# Patient Record
Sex: FEMALE | Race: BLACK OR AFRICAN AMERICAN | NOT HISPANIC OR LATINO | Employment: OTHER | RURAL
[De-identification: names, ages, dates, MRNs, and addresses within clinical notes are randomized per-mention and may not be internally consistent; named-entity substitution may affect disease eponyms.]

---

## 2024-11-21 DIAGNOSIS — R07.9 CHEST PAIN: Primary | ICD-10-CM

## 2024-12-13 ENCOUNTER — OFFICE VISIT (OUTPATIENT)
Dept: CARDIOLOGY | Facility: CLINIC | Age: 64
End: 2024-12-13
Payer: MEDICARE

## 2024-12-13 VITALS
DIASTOLIC BLOOD PRESSURE: 80 MMHG | SYSTOLIC BLOOD PRESSURE: 120 MMHG | WEIGHT: 197 LBS | HEART RATE: 70 BPM | BODY MASS INDEX: 36.25 KG/M2 | OXYGEN SATURATION: 98 % | HEIGHT: 62 IN

## 2024-12-13 DIAGNOSIS — R06.09 DYSPNEA ON EXERTION: ICD-10-CM

## 2024-12-13 DIAGNOSIS — R07.9 CHEST PAIN, UNSPECIFIED TYPE: Primary | ICD-10-CM

## 2024-12-13 PROCEDURE — 93005 ELECTROCARDIOGRAM TRACING: CPT | Mod: PBBFAC | Performed by: STUDENT IN AN ORGANIZED HEALTH CARE EDUCATION/TRAINING PROGRAM

## 2024-12-13 PROCEDURE — 99204 OFFICE O/P NEW MOD 45 MIN: CPT | Mod: S$PBB,,, | Performed by: STUDENT IN AN ORGANIZED HEALTH CARE EDUCATION/TRAINING PROGRAM

## 2024-12-13 PROCEDURE — 93010 ELECTROCARDIOGRAM REPORT: CPT | Mod: S$PBB,,, | Performed by: STUDENT IN AN ORGANIZED HEALTH CARE EDUCATION/TRAINING PROGRAM

## 2024-12-13 PROCEDURE — 99999 PR PBB SHADOW E&M-NEW PATIENT-LVL IV: CPT | Mod: PBBFAC,,, | Performed by: STUDENT IN AN ORGANIZED HEALTH CARE EDUCATION/TRAINING PROGRAM

## 2024-12-13 PROCEDURE — 99204 OFFICE O/P NEW MOD 45 MIN: CPT | Mod: PBBFAC | Performed by: STUDENT IN AN ORGANIZED HEALTH CARE EDUCATION/TRAINING PROGRAM

## 2024-12-13 RX ORDER — HYDRALAZINE HYDROCHLORIDE 25 MG/1
25 TABLET, FILM COATED ORAL 4 TIMES DAILY
COMMUNITY
Start: 2024-11-11

## 2024-12-13 RX ORDER — LOSARTAN POTASSIUM 50 MG/1
50 TABLET ORAL DAILY
COMMUNITY
Start: 2024-09-05

## 2024-12-13 RX ORDER — HYDROCHLOROTHIAZIDE 25 MG/1
25 TABLET ORAL DAILY
COMMUNITY
Start: 2024-11-11

## 2024-12-13 NOTE — PROGRESS NOTES
"PCP: No primary care provider on file.    Referring Provider: Elise Pierce, NP  [address] [facility]  [place] [id number]    Reason for referral: Chest pain    Subjective:   Sissy Pace is a 64 y.o. female with hx of diabetes, hypertension hyperlipidemia who presents for a new patient visit.    Referred for evaluation of chest pain. ED presentation on 11/11/24 with complaints of chest pain, found to have elevated BP -> 208/98. She was at at work, started to feel unwell and went to the bathroom and vomited and then started having chest pain. EKG w/ no acute ischemic changes Hs-trop was negative. She had another episode w/ chest pain associated with dyspnea and palpitations two days ago.     Fhx: No known FH of cardiac disease  Shx: Never smoker    EKG 12/13/24: Sinus rhythm with short [place]     ECHO No results found for this or any previous visit.     CATH: No results found for this or any previous visit.     No results found for: "NA", "K", "CL", "CO2", "BUN", "CREATININE", "CALCIUM", "ANIONGAP", "ESTGFRAFRICA", "EGFRNONAA"    No results found for: "CHOL"  No results found for: "HDL"  No results found for: "LDLCALC"  No results found for: "TRIG"  No results found for: "CHOLHDL"    No results found for: "WBC", "HGB", "HCT", "MCV", "PLT"        Current Outpatient Medications:     hydrALAZINE (APRESOLINE) 25 MG tablet, Take 25 mg by mouth 4 (four) times daily., Disp: , Rfl:     hydroCHLOROthiazide (HYDRODIURIL) 25 MG tablet, Take 25 mg by mouth once daily., Disp: , Rfl:     losartan (COZAAR) 50 MG tablet, Take 50 mg by mouth once daily., Disp: , Rfl:     Review of Systems   Constitutional:  Negative for chills, diaphoresis, fever and malaise/fatigue.   Respiratory:  Positive for shortness of breath. Negative for cough.    Cardiovascular:  Positive for chest pain and palpitations. Negative for orthopnea, claudication, leg swelling and PND.   Gastrointestinal:  Negative for abdominal pain, heartburn, " "nausea and vomiting.   Neurological:  Negative for dizziness.       Objective:   /80 (BP Location: Right arm, Patient Position: Sitting)   Pulse 70   Ht 5' 2" (1.575 m)   Wt 89.4 kg (197 lb)   SpO2 98%   BMI 36.03 kg/m²     Physical Exam  Constitutional:       General: She is not in acute distress.     Appearance: Normal appearance.   Cardiovascular:      Rate and Rhythm: Normal rate and regular rhythm.      Pulses: Normal pulses.      Heart sounds: Normal heart sounds. No murmur heard.     No friction rub. No gallop.   Pulmonary:      Effort: Pulmonary effort is normal.      Breath sounds: Normal breath sounds. No wheezing or rales.   Musculoskeletal:      Right lower leg: No edema.      Left lower leg: No edema.   Skin:     General: Skin is warm and dry.   Neurological:      Mental Status: She is alert.           Assessment:     1. Chest pain, unspecified type  EKG 12-lead    Ambulatory referral/consult to Cardiology    EKG 12-lead    Echo    NM Myocardial Perfusion Spect Multi Pharmacologic    Nuclear Stress Test      2. Dyspnea on exertion  Echo            Plan:   No problem-specific Assessment & Plan notes found for this encounter.      Chest pain  Typical, w/ exertion  Risk factors: DM, HTN, HLD  Intermediate pre-test probability of CAD  - Schedule pharmacologic stress test with nuclear imaging  - Schedule echo for evaluation of structural heart disease    Dyspnea on exertion  - Euvolemic on exam  - Schedule echo    Follow-up in 3 months   "

## 2024-12-17 LAB
OHS QRS DURATION: 92 MS
OHS QTC CALCULATION: 439 MS

## 2025-01-02 ENCOUNTER — HOSPITAL ENCOUNTER (OUTPATIENT)
Dept: RADIOLOGY | Facility: HOSPITAL | Age: 65
Discharge: HOME | End: 2025-01-02
Attending: STUDENT IN AN ORGANIZED HEALTH CARE EDUCATION/TRAINING PROGRAM
Payer: MEDICARE

## 2025-01-02 ENCOUNTER — HOSPITAL ENCOUNTER (OUTPATIENT)
Dept: CARDIOLOGY | Facility: HOSPITAL | Age: 65
Discharge: HOME | End: 2025-01-02
Attending: STUDENT IN AN ORGANIZED HEALTH CARE EDUCATION/TRAINING PROGRAM
Payer: MEDICARE

## 2025-01-02 DIAGNOSIS — R06.09 DYSPNEA ON EXERTION: ICD-10-CM

## 2025-01-02 DIAGNOSIS — R07.9 CHEST PAIN, UNSPECIFIED TYPE: ICD-10-CM

## 2025-01-02 LAB
CV STRESS BASE HR: 59 BPM
DIASTOLIC BLOOD PRESSURE: 91 MMHG
OHS CV CPX 1 MINUTE RECOVERY HEART RATE: 79 BPM
OHS CV CPX 85 PERCENT MAX PREDICTED HEART RATE MALE: 132
OHS CV CPX MAX PREDICTED HEART RATE: 155
OHS CV CPX PATIENT IS FEMALE: 1
OHS CV CPX PATIENT IS MALE: 0
OHS CV CPX PEAK DIASTOLIC BLOOD PRESSURE: 78 MMHG
OHS CV CPX PEAK HEAR RATE: 105 BPM
OHS CV CPX PEAK RATE PRESSURE PRODUCT: NORMAL
OHS CV CPX PEAK SYSTOLIC BLOOD PRESSURE: 117 MMHG
OHS CV CPX PERCENT MAX PREDICTED HEART RATE ACHIEVED: 71
OHS CV CPX RATE PRESSURE PRODUCT PRESENTING: 8909
STRESS ECHO POST EXERCISE DUR MIN: 1 MINUTES
STRESS ECHO POST EXERCISE DUR SEC: 56 SECONDS
SYSTOLIC BLOOD PRESSURE: 151 MMHG

## 2025-01-02 PROCEDURE — 63600175 PHARM REV CODE 636 W HCPCS: Performed by: STUDENT IN AN ORGANIZED HEALTH CARE EDUCATION/TRAINING PROGRAM

## 2025-01-02 PROCEDURE — 78452 HT MUSCLE IMAGE SPECT MULT: CPT | Mod: TC

## 2025-01-02 PROCEDURE — A9500 TC99M SESTAMIBI: HCPCS | Performed by: STUDENT IN AN ORGANIZED HEALTH CARE EDUCATION/TRAINING PROGRAM

## 2025-01-02 PROCEDURE — 93018 CV STRESS TEST I&R ONLY: CPT | Mod: ,,, | Performed by: STUDENT IN AN ORGANIZED HEALTH CARE EDUCATION/TRAINING PROGRAM

## 2025-01-02 PROCEDURE — 78452 HT MUSCLE IMAGE SPECT MULT: CPT | Mod: 26,,, | Performed by: STUDENT IN AN ORGANIZED HEALTH CARE EDUCATION/TRAINING PROGRAM

## 2025-01-02 PROCEDURE — 93306 TTE W/DOPPLER COMPLETE: CPT

## 2025-01-02 PROCEDURE — 93306 TTE W/DOPPLER COMPLETE: CPT | Mod: 26,,, | Performed by: STUDENT IN AN ORGANIZED HEALTH CARE EDUCATION/TRAINING PROGRAM

## 2025-01-02 PROCEDURE — 93017 CV STRESS TEST TRACING ONLY: CPT

## 2025-01-02 PROCEDURE — 93016 CV STRESS TEST SUPVJ ONLY: CPT | Mod: ,,, | Performed by: STUDENT IN AN ORGANIZED HEALTH CARE EDUCATION/TRAINING PROGRAM

## 2025-01-02 RX ORDER — TETRAKIS(2-METHOXYISOBUTYLISOCYANIDE)COPPER(I) TETRAFLUOROBORATE 1 MG/ML
34 INJECTION, POWDER, LYOPHILIZED, FOR SOLUTION INTRAVENOUS
Status: COMPLETED | OUTPATIENT
Start: 2025-01-02 | End: 2025-01-02

## 2025-01-02 RX ORDER — REGADENOSON 0.08 MG/ML
0.4 INJECTION, SOLUTION INTRAVENOUS ONCE
Status: COMPLETED | OUTPATIENT
Start: 2025-01-02 | End: 2025-01-02

## 2025-01-02 RX ORDER — TETRAKIS(2-METHOXYISOBUTYLISOCYANIDE)COPPER(I) TETRAFLUOROBORATE 1 MG/ML
11 INJECTION, POWDER, LYOPHILIZED, FOR SOLUTION INTRAVENOUS
Status: COMPLETED | OUTPATIENT
Start: 2025-01-02 | End: 2025-01-02

## 2025-01-02 RX ADMIN — KIT FOR THE PREPARATION OF TECHNETIUM TC99M SESTAMIBI 11 MILLICURIE: 1 INJECTION, POWDER, LYOPHILIZED, FOR SOLUTION PARENTERAL at 08:01

## 2025-01-02 RX ADMIN — KIT FOR THE PREPARATION OF TECHNETIUM TC99M SESTAMIBI 34 MILLICURIE: 1 INJECTION, POWDER, LYOPHILIZED, FOR SOLUTION PARENTERAL at 09:01

## 2025-01-02 RX ADMIN — REGADENOSON 0.4 MG: 0.08 INJECTION, SOLUTION INTRAVENOUS at 09:01

## 2025-01-04 LAB
AORTIC ROOT ANNULUS: 2.41 CM
AV INDEX (PROSTH): 0.71
AV MEAN GRADIENT: 6.4 MMHG
AV PEAK GRADIENT: 11.6 MMHG
AV VALVE AREA BY VELOCITY RATIO: 1.7 CM²
AV VALVE AREA: 2.2 CM²
AV VELOCITY RATIO: 0.53
CV ECHO LV RWT: 0.33 CM
DOP CALC AO PEAK VEL: 1.7 M/S
DOP CALC AO VTI: 40.4 CM
DOP CALC LVOT AREA: 3.1 CM2
DOP CALC LVOT DIAMETER: 2 CM
DOP CALC LVOT PEAK VEL: 0.9 M/S
DOP CALC LVOT STROKE VOLUME: 90.4 CM3
DOP CALCLVOT PEAK VEL VTI: 28.8 CM
E WAVE DECELERATION TIME: 362.26 MSEC
E/A RATIO: 0.65
E/E' RATIO: 6.35 M/S
ECHO LV POSTERIOR WALL: 0.7 CM (ref 0.6–1.1)
FRACTIONAL SHORTENING: 39.5 % (ref 28–44)
INTERVENTRICULAR SEPTUM: 1.2 CM (ref 0.6–1.1)
IVC DIAMETER: 0.61 CM
LEFT ATRIUM AREA SYSTOLIC (APICAL 2 CHAMBER): 22.09 CM2
LEFT ATRIUM AREA SYSTOLIC (APICAL 4 CHAMBER): 16.34 CM2
LEFT ATRIUM VOLUME MOD: 52.15 ML
LEFT INTERNAL DIMENSION IN SYSTOLE: 2.6 CM (ref 2.1–4)
LEFT VENTRICLE DIASTOLIC VOLUME: 82.61 ML
LEFT VENTRICLE END SYSTOLIC VOLUME APICAL 2 CHAMBER: 61.72 ML
LEFT VENTRICLE END SYSTOLIC VOLUME APICAL 4 CHAMBER: 39.81 ML
LEFT VENTRICLE SYSTOLIC VOLUME: 24.16 ML
LEFT VENTRICULAR INTERNAL DIMENSION IN DIASTOLE: 4.3 CM (ref 3.5–6)
LEFT VENTRICULAR MASS: 132.7 G
LV LATERAL E/E' RATIO: 4.91 M/S
LV SEPTAL E/E' RATIO: 9 M/S
LVED V (TEICH): 82.61 ML
LVES V (TEICH): 24.16 ML
LVOT MG: 1.91 MMHG
LVOT MV: 0.65 CM/S
MV PEAK A VEL: 0.83 M/S
MV PEAK E VEL: 0.54 M/S
OHS CV RV/LV RATIO: 0.6 CM
OHS LV EJECTION FRACTION SIMPSONS BIPLANE MOD: 52 %
PISA TR MAX VEL: 2.86 M/S
PV PEAK GRADIENT: 3 MMHG
PV PEAK VELOCITY: 0.84 M/S
RA PRESSURE ESTIMATED: 3 MMHG
RA VOL SYS: 25.93 ML
RIGHT ATRIAL AREA: 11.7 CM2
RIGHT ATRIUM VOLUME AREA LENGTH APICAL 4 CHAMBER: 25.68 ML
RIGHT VENTRICLE DIASTOLIC BASEL DIMENSION: 2.6 CM
RIGHT VENTRICLE DIASTOLIC LENGTH: 5.7 CM
RIGHT VENTRICLE DIASTOLIC MID DIMENSION: 2.9 CM
RIGHT VENTRICULAR LENGTH IN DIASTOLE (APICAL 4-CHAMBER VIEW): 5.73 CM
RV MID DIAMA: 2.87 CM
RV TB RVSP: 6 MMHG
TDI LATERAL: 0.11 M/S
TDI SEPTAL: 0.06 M/S
TDI: 0.09 M/S
TR MAX PG: 33 MMHG
TRICUSPID ANNULAR PLANE SYSTOLIC EXCURSION: 1.92 CM
TV REST PULMONARY ARTERY PRESSURE: 36 MMHG

## 2025-04-24 ENCOUNTER — OFFICE VISIT (OUTPATIENT)
Dept: CARDIOLOGY | Facility: CLINIC | Age: 65
End: 2025-04-24
Payer: MEDICARE

## 2025-04-24 VITALS
BODY MASS INDEX: 36.62 KG/M2 | OXYGEN SATURATION: 99 % | SYSTOLIC BLOOD PRESSURE: 122 MMHG | HEIGHT: 62 IN | DIASTOLIC BLOOD PRESSURE: 88 MMHG | HEART RATE: 68 BPM | WEIGHT: 199 LBS

## 2025-04-24 DIAGNOSIS — I34.0 MITRAL VALVE INSUFFICIENCY, UNSPECIFIED ETIOLOGY: Primary | ICD-10-CM

## 2025-04-24 PROCEDURE — 3079F DIAST BP 80-89 MM HG: CPT | Mod: CPTII,,, | Performed by: STUDENT IN AN ORGANIZED HEALTH CARE EDUCATION/TRAINING PROGRAM

## 2025-04-24 PROCEDURE — 99214 OFFICE O/P EST MOD 30 MIN: CPT | Mod: PBBFAC | Performed by: STUDENT IN AN ORGANIZED HEALTH CARE EDUCATION/TRAINING PROGRAM

## 2025-04-24 PROCEDURE — 4010F ACE/ARB THERAPY RXD/TAKEN: CPT | Mod: CPTII,,, | Performed by: STUDENT IN AN ORGANIZED HEALTH CARE EDUCATION/TRAINING PROGRAM

## 2025-04-24 PROCEDURE — 3288F FALL RISK ASSESSMENT DOCD: CPT | Mod: CPTII,,, | Performed by: STUDENT IN AN ORGANIZED HEALTH CARE EDUCATION/TRAINING PROGRAM

## 2025-04-24 PROCEDURE — 99999 PR PBB SHADOW E&M-EST. PATIENT-LVL IV: CPT | Mod: PBBFAC,,, | Performed by: STUDENT IN AN ORGANIZED HEALTH CARE EDUCATION/TRAINING PROGRAM

## 2025-04-24 PROCEDURE — 3074F SYST BP LT 130 MM HG: CPT | Mod: CPTII,,, | Performed by: STUDENT IN AN ORGANIZED HEALTH CARE EDUCATION/TRAINING PROGRAM

## 2025-04-24 PROCEDURE — 1159F MED LIST DOCD IN RCRD: CPT | Mod: CPTII,,, | Performed by: STUDENT IN AN ORGANIZED HEALTH CARE EDUCATION/TRAINING PROGRAM

## 2025-04-24 PROCEDURE — 99213 OFFICE O/P EST LOW 20 MIN: CPT | Mod: S$PBB,,, | Performed by: STUDENT IN AN ORGANIZED HEALTH CARE EDUCATION/TRAINING PROGRAM

## 2025-04-24 PROCEDURE — 1126F AMNT PAIN NOTED NONE PRSNT: CPT | Mod: CPTII,,, | Performed by: STUDENT IN AN ORGANIZED HEALTH CARE EDUCATION/TRAINING PROGRAM

## 2025-04-24 PROCEDURE — 3044F HG A1C LEVEL LT 7.0%: CPT | Mod: CPTII,,, | Performed by: STUDENT IN AN ORGANIZED HEALTH CARE EDUCATION/TRAINING PROGRAM

## 2025-04-24 PROCEDURE — 3008F BODY MASS INDEX DOCD: CPT | Mod: CPTII,,, | Performed by: STUDENT IN AN ORGANIZED HEALTH CARE EDUCATION/TRAINING PROGRAM

## 2025-04-24 PROCEDURE — 1101F PT FALLS ASSESS-DOCD LE1/YR: CPT | Mod: CPTII,,, | Performed by: STUDENT IN AN ORGANIZED HEALTH CARE EDUCATION/TRAINING PROGRAM

## 2025-04-24 RX ORDER — DICLOFENAC SODIUM 10 MG/G
2 GEL TOPICAL 4 TIMES DAILY PRN
COMMUNITY
Start: 2024-06-04

## 2025-04-24 RX ORDER — LORATADINE 10 MG/1
10 TABLET ORAL
COMMUNITY

## 2025-04-24 RX ORDER — EZETIMIBE 10 MG/1
10 TABLET ORAL
COMMUNITY
Start: 2025-03-28

## 2025-04-24 RX ORDER — NITROGLYCERIN 0.4 MG/1
0.4 TABLET SUBLINGUAL
COMMUNITY
Start: 2024-11-12

## 2025-04-24 RX ORDER — METFORMIN HYDROCHLORIDE 500 MG/1
500 TABLET ORAL
COMMUNITY
Start: 2025-03-31

## 2025-04-24 RX ORDER — ATORVASTATIN CALCIUM 40 MG/1
40 TABLET, FILM COATED ORAL NIGHTLY
COMMUNITY
Start: 2025-03-31

## 2025-04-24 NOTE — PROGRESS NOTES
PCP: No primary care provider on file.    Referring Provider:     Subjective:   Sissy Pace is a 64 y.o. female with hx of diabetes, hypertension hyperlipidemia who presents follow up.      04/24/2025:  No recurrence of chest pain in the interim.  Denies dyspnea on exertion or any other cardiac complaints.  Notes intermittent leg edema usually with prolonged standing.    12/13/24: Referred for evaluation of chest pain. ED presentation on 11/11/24 with complaints of chest pain, found to have elevated BP -> 208/98. She was at at work, started to feel unwell and went to the bathroom and vomited and then started having chest pain. EKG w/ no acute ischemic changes Hs-trop was negative. She had another episode w/ chest pain associated with dyspnea and palpitations two days ago.     Fhx: No known FH of cardiac disease  Shx: Never smoker    EKG 12/13/24: Sinus rhythm with short [place]     ECHO Results for orders placed during the hospital encounter of 01/02/25    Echo    Interpretation Summary    Left Ventricle: The left ventricle is normal in size. Normal wall thickness. There is low normal systolic function with a visually estimated ejection fraction of 50 - 55%. Quantitated ejection fraction is 52%. There is normal diastolic function.    Right Ventricle: Normal right ventricular cavity size. Systolic function is normal.    Mitral Valve: There is mild to moderate regurgitation.    Tricuspid Valve: There is mild to moderate regurgitation.    Pulmonic Valve: There is mild regurgitation.    Pulmonary Artery: The estimated pulmonary artery systolic pressure is 36 mmHg.    IVC/SVC: Normal venous pressure at 3 mmHg.       CATH: No results found for this or any previous visit.    NM MPI 01/02/2025  Impression:     1.  Scintigraphically negative for ischemia or infarct.  2. the global left ventricular systolic function is normal with an LV ejection fraction of 74 % and no evidence of LV dilatation. Wall motion is normal.     No  "results found for: "NA", "K", "CL", "CO2", "BUN", "CREATININE", "CALCIUM", "ANIONGAP", "ESTGFRAFRICA", "EGFRNONAA"    No results found for: "CHOL"  No results found for: "HDL"  No results found for: "LDLCALC"  No results found for: "TRIG"  No results found for: "CHOLHDL"    No results found for: "WBC", "HGB", "HCT", "MCV", "PLT"        Current Outpatient Medications:     atorvastatin (LIPITOR) 40 MG tablet, Take 40 mg by mouth every evening., Disp: , Rfl:     diclofenac sodium (VOLTAREN) 1 % Gel, Apply 2 g topically 4 (four) times daily as needed., Disp: , Rfl:     ezetimibe (ZETIA) 10 mg tablet, Take 10 mg by mouth., Disp: , Rfl:     hydrALAZINE (APRESOLINE) 25 MG tablet, Take 25 mg by mouth 4 (four) times daily., Disp: , Rfl:     hydroCHLOROthiazide (HYDRODIURIL) 25 MG tablet, Take 25 mg by mouth once daily., Disp: , Rfl:     loratadine (CLARITIN) 10 mg tablet, Take 10 mg by mouth., Disp: , Rfl:     losartan (COZAAR) 50 MG tablet, Take 50 mg by mouth once daily., Disp: , Rfl:     metFORMIN (GLUCOPHAGE) 500 MG tablet, Take 500 mg by mouth., Disp: , Rfl:     nitroGLYCERIN (NITROSTAT) 0.4 MG SL tablet, Place 0.4 mg under the tongue., Disp: , Rfl:     Review of Systems   Constitutional:  Negative for chills, diaphoresis, fever and malaise/fatigue.   Respiratory:  Negative for cough and shortness of breath.    Cardiovascular:  Positive for leg swelling. Negative for chest pain, palpitations, orthopnea, claudication and PND.   Gastrointestinal:  Negative for abdominal pain, heartburn, nausea and vomiting.   Neurological:  Negative for dizziness.       Objective:   /88 (BP Location: Left arm, Patient Position: Sitting)   Pulse 68   Ht 5' 2" (1.575 m)   Wt 90.3 kg (199 lb)   SpO2 99%   BMI 36.40 kg/m²     Physical Exam  Constitutional:       General: She is not in acute distress.     Appearance: Normal appearance.   Cardiovascular:      Rate and Rhythm: Normal rate and regular rhythm.      Pulses: Normal " pulses.      Heart sounds: Normal heart sounds. No murmur heard.     No friction rub. No gallop.   Pulmonary:      Effort: Pulmonary effort is normal.      Breath sounds: Normal breath sounds. No wheezing or rales.   Musculoskeletal:      Right lower leg: No edema.      Left lower leg: No edema.   Skin:     General: Skin is warm and dry.   Neurological:      Mental Status: She is alert.           Assessment:     1. Mitral valve insufficiency, unspecified etiology                Plan:   No problem-specific Assessment & Plan notes found for this encounter.      Chest pain (resolved)  NM MPI negative for ischemia    Dyspnea on exertion (resolved)  - echo with no clinically significant structural heart disease     Mild-to-moderate MR   Repeat echo 2 years from prior- approximately January 2027    Follow-up in 1 year